# Patient Record
Sex: MALE | Race: WHITE | NOT HISPANIC OR LATINO | Employment: OTHER | URBAN - METROPOLITAN AREA
[De-identification: names, ages, dates, MRNs, and addresses within clinical notes are randomized per-mention and may not be internally consistent; named-entity substitution may affect disease eponyms.]

---

## 2018-04-27 LAB
ACETAMINOPHEN LEVEL (HISTORICAL): < 10 MCQ/M
ALBUMIN SERPL BCP-MCNC: 4.4 G/DL (ref 3.5–5.7)
ALP SERPL-CCNC: 69 IU/L (ref 40–150)
ALT SERPL W P-5'-P-CCNC: 81 IU/L (ref 0–50)
ANION GAP SERPL CALCULATED.3IONS-SCNC: 13.8 MM/L
APTT PPP: 26.5 SEC (ref 24.4–37.6)
AST SERPL W P-5'-P-CCNC: 43 U/L (ref 8–27)
BASOPHILS # BLD AUTO: 0.1 X3/UL (ref 0–0.3)
BASOPHILS # BLD AUTO: 0.7 % (ref 0–2)
BILIRUB SERPL-MCNC: 0.4 MG/DL (ref 0.3–1)
BUN SERPL-MCNC: 11 MG/DL (ref 7–25)
CALCIUM SERPL-MCNC: 9.2 MG/DL (ref 8.6–10.5)
CHLORIDE SERPL-SCNC: 102 MM/L (ref 98–107)
CK SERPL-CCNC: 106 IU/L (ref 30–223)
CO2 SERPL-SCNC: 27 MM/L (ref 21–31)
CREAT SERPL-MCNC: 0.89 MG/DL (ref 0.7–1.3)
DEPRECATED RDW RBC AUTO: 13.4 % (ref 11.5–14.5)
EGFR (HISTORICAL): > 60 GFR
EGFR AFRICAN AMERICAN (HISTORICAL): > 60 GFR
EOSINOPHIL # BLD AUTO: 0.3 X3/UL (ref 0–0.5)
EOSINOPHIL NFR BLD AUTO: 1.7 % (ref 0–5)
ETHANOL (HISTORICAL): < 10 MG/DL
GLUCOSE (HISTORICAL): 131 MG/DL (ref 65–99)
HCT VFR BLD AUTO: 40 % (ref 42–52)
HGB BLD-MCNC: 13.3 G/DL (ref 14–18)
INR PPP: 0.97 (ref 0.9–1.5)
LYMPHOCYTES # BLD AUTO: 5.7 X3/UL (ref 1.2–4.2)
LYMPHOCYTES NFR BLD AUTO: 34.6 % (ref 20.5–51.1)
MCH RBC QN AUTO: 29.6 PG (ref 26–34)
MCHC RBC AUTO-ENTMCNC: 33.4 G/DL (ref 31–36)
MCV RBC AUTO: 88.6 FL (ref 81–99)
MEDICAL ALCOHOL (HISTORICAL): 0 %
MONOCYTES # BLD AUTO: 1.6 X3/UL (ref 0–1)
MONOCYTES NFR BLD AUTO: 9.6 % (ref 1.7–12)
NEUTROPHILS # BLD AUTO: 8.8 X3/UL (ref 1.4–6.5)
NEUTS SEG NFR BLD AUTO: 53.4 % (ref 42.2–75.2)
OSMOLALITY, SERUM (HISTORICAL): 279 MOSM (ref 262–291)
PLATELET # BLD AUTO: 403 X3/UL (ref 130–400)
PMV BLD AUTO: 7.3 FL (ref 8.6–11.7)
POTASSIUM SERPL-SCNC: 3.8 MM/L (ref 3.5–5.5)
PROTHROMBIN TIME (HISTORICAL): 11.2 SEC (ref 10.1–12.9)
RBC # BLD AUTO: 4.51 X6/UL (ref 4.3–5.9)
SALICYLATE LEVEL (HISTORICAL): < 5 MG/DL (ref 10–40)
SODIUM SERPL-SCNC: 139 MM/L (ref 134–143)
TOTAL PROTEIN (HISTORICAL): 7.4 G/DL (ref 6.4–8.9)
TROPONIN I SERPL-MCNC: < 0.03 NG/ML
TSH SERPL DL<=0.05 MIU/L-ACNC: 1.2 UIU/M (ref 0.45–5.33)
WBC # BLD AUTO: 16.4 X3/UL (ref 4.8–10.8)

## 2022-10-31 ENCOUNTER — APPOINTMENT (EMERGENCY)
Dept: RADIOLOGY | Facility: HOSPITAL | Age: 42
End: 2022-10-31

## 2022-10-31 ENCOUNTER — HOSPITAL ENCOUNTER (EMERGENCY)
Facility: HOSPITAL | Age: 42
Discharge: HOME | End: 2022-10-31
Attending: EMERGENCY MEDICINE

## 2022-10-31 VITALS
SYSTOLIC BLOOD PRESSURE: 142 MMHG | DIASTOLIC BLOOD PRESSURE: 105 MMHG | OXYGEN SATURATION: 98 % | HEART RATE: 71 BPM | TEMPERATURE: 97.6 F | RESPIRATION RATE: 19 BRPM

## 2022-10-31 DIAGNOSIS — R07.9 CHEST PAIN: Primary | ICD-10-CM

## 2022-10-31 DIAGNOSIS — F11.10 NARCOTIC ABUSE (HCC): ICD-10-CM

## 2022-10-31 LAB
4HR DELTA HS TROPONIN: 3 NG/L
ALBUMIN SERPL BCP-MCNC: 4.4 G/DL (ref 3.5–5)
ALP SERPL-CCNC: 60 U/L (ref 34–104)
ALT SERPL W P-5'-P-CCNC: 25 U/L (ref 7–52)
ANION GAP SERPL CALCULATED.3IONS-SCNC: 9 MMOL/L (ref 4–13)
AST SERPL W P-5'-P-CCNC: 22 U/L (ref 13–39)
BASOPHILS # BLD AUTO: 0.08 THOUSANDS/ÂΜL (ref 0–0.1)
BASOPHILS NFR BLD AUTO: 1 % (ref 0–1)
BILIRUB SERPL-MCNC: 0.42 MG/DL (ref 0.2–1)
BUN SERPL-MCNC: 10 MG/DL (ref 5–25)
CALCIUM SERPL-MCNC: 9.4 MG/DL (ref 8.4–10.2)
CARDIAC TROPONIN I PNL SERPL HS: 4 NG/L
CARDIAC TROPONIN I PNL SERPL HS: 7 NG/L
CHLORIDE SERPL-SCNC: 102 MMOL/L (ref 96–108)
CO2 SERPL-SCNC: 27 MMOL/L (ref 21–32)
CREAT SERPL-MCNC: 0.8 MG/DL (ref 0.6–1.3)
EOSINOPHIL # BLD AUTO: 0.19 THOUSAND/ÂΜL (ref 0–0.61)
EOSINOPHIL NFR BLD AUTO: 2 % (ref 0–6)
ERYTHROCYTE [DISTWIDTH] IN BLOOD BY AUTOMATED COUNT: 12.6 % (ref 11.6–15.1)
ETHANOL EXG-MCNC: 0 MG/DL
GFR SERPL CREATININE-BSD FRML MDRD: 110 ML/MIN/1.73SQ M
GLUCOSE SERPL-MCNC: 160 MG/DL (ref 65–140)
HCT VFR BLD AUTO: 39 % (ref 36.5–49.3)
HGB BLD-MCNC: 13.1 G/DL (ref 12–17)
IMM GRANULOCYTES # BLD AUTO: 0.05 THOUSAND/UL (ref 0–0.2)
IMM GRANULOCYTES NFR BLD AUTO: 0 % (ref 0–2)
LYMPHOCYTES # BLD AUTO: 3.55 THOUSANDS/ÂΜL (ref 0.6–4.47)
LYMPHOCYTES NFR BLD AUTO: 30 % (ref 14–44)
MCH RBC QN AUTO: 30.4 PG (ref 26.8–34.3)
MCHC RBC AUTO-ENTMCNC: 33.6 G/DL (ref 31.4–37.4)
MCV RBC AUTO: 91 FL (ref 82–98)
MONOCYTES # BLD AUTO: 0.74 THOUSAND/ÂΜL (ref 0.17–1.22)
MONOCYTES NFR BLD AUTO: 6 % (ref 4–12)
NEUTROPHILS # BLD AUTO: 7.05 THOUSANDS/ÂΜL (ref 1.85–7.62)
NEUTS SEG NFR BLD AUTO: 61 % (ref 43–75)
NRBC BLD AUTO-RTO: 0 /100 WBCS
PLATELET # BLD AUTO: 309 THOUSANDS/UL (ref 149–390)
PMV BLD AUTO: 9.6 FL (ref 8.9–12.7)
POTASSIUM SERPL-SCNC: 3.6 MMOL/L (ref 3.5–5.3)
PROT SERPL-MCNC: 7.9 G/DL (ref 6.4–8.4)
RBC # BLD AUTO: 4.31 MILLION/UL (ref 3.88–5.62)
SODIUM SERPL-SCNC: 138 MMOL/L (ref 135–147)
WBC # BLD AUTO: 11.66 THOUSAND/UL (ref 4.31–10.16)

## 2022-11-01 LAB
ATRIAL RATE: 69 BPM
P AXIS: 55 DEGREES
PR INTERVAL: 164 MS
QRS AXIS: 60 DEGREES
QRSD INTERVAL: 86 MS
QT INTERVAL: 404 MS
QTC INTERVAL: 432 MS
T WAVE AXIS: 18 DEGREES
VENTRICULAR RATE: 69 BPM

## 2022-11-01 NOTE — ED NOTES
This writer discussed the patients current presentation and recommended discharge plan with Dr Leda Beckett  They agree with the patient being discharged at this time with referrals and/or information about outpatient  The patient was Instructed to follow up with their PCP  The patient was provided with referral information for outpatient drug rehab  This writer and the patient completed a safety plan  The patient was provided with a copy of their safety plan with encouragement to utilize the plan following discharge  In addition, the patient was instructed to call local [facility] crisis, other crisis services, [facility] or to go to the nearest ER immediately if their situation changes at any time  This writer discussed discharge plans with the patient and family who agrees with and understands the discharge plans           SAFETY PLAN  Warning Signs (thoughts, images, mood, behavior, situations) of a potential crisis: life stressors      Coping Skills (what can I do to take my mind off the problem, or to keep myself safe): no coping skills besides substance use      Outside Support (who can I reach out to for support and help): wife and daughter        National Suicide Prevention Hotline:  [address]: [facility]: [place] [phone] - [address] [phone] - Crisis   [phone] - Peer Support Talk Line (1-9pm daily)  [phone] - Teen Support Talk Line (1-9pm daily)  [address] 1111 [facility]) [phone] - 1758 [facility]

## 2022-11-01 NOTE — ED PROVIDER NOTES
History  Chief Complaint   Patient presents with   • Chest Pain     Pt reports hx of Fentanyl use, last dose 2 hours ago  Reports intermittent CP over the last hour  Reports chest has not felt right over the past month   (+)SOB/N        used: No      69-year-old male presents emergency department for evaluation of left-sided chest pain intermittent over the past several months  Patient states he feels it is due to his fentanyl usage  When he stopped using this type of fentanyl when he moved to the [facility] his pain had improved  Denies any aggravating or alleviating symptoms  Also feels some component of anxiety causing his pain to worsened today  Denies any new abdominal pain  Reports nausea, no hematemesis  Reports history of ulcerative colitis  None       History reviewed  No pertinent past medical history  History reviewed  No pertinent surgical history  History reviewed  No pertinent family history  I have reviewed and agree with the history as documented  E-Cigarette/Vaping     E-Cigarette/Vaping Substances   • Nicotine Yes    • THC Yes    • CBD No      Social History     Tobacco Use   • Smoking status: Current Every Day Smoker     Packs/day: 1 50   • Smokeless tobacco: Never Used   Substance Use Topics   • Drug use: Yes     Comment: [place] with dialazine       Review of Systems   All other systems reviewed and are negative  Physical Exam  Physical Exam  Vitals and nursing note reviewed  Constitutional:       General: He is not in acute distress  Appearance: He is well-developed  He is not diaphoretic  HENT:      Head: Normocephalic and atraumatic  Right Ear: External ear normal       Left Ear: External ear normal    Eyes:      General:         Right eye: No discharge  Left eye: No discharge  Pupils: Pupils are equal, round, and reactive to light  Neck:      Thyroid: No thyromegaly  Trachea: No tracheal deviation  Cardiovascular:      Rate and Rhythm: Normal rate and regular rhythm  Heart sounds: No murmur heard  Pulmonary:      Effort: Pulmonary effort is normal       Breath sounds: Normal breath sounds  Abdominal:      General: Bowel sounds are normal  There is no distension  Palpations: Abdomen is soft  Tenderness: There is no abdominal tenderness  Musculoskeletal:         General: No deformity  Normal range of motion  Cervical back: Normal range of motion and neck supple  Skin:     General: Skin is warm  Capillary Refill: Capillary refill takes less than 2 seconds  Neurological:      Mental Status: He is alert and oriented to person, place, and time  Cranial Nerves: No cranial nerve deficit  Motor: No abnormal muscle tone     Psychiatric:         Behavior: Behavior normal          Vital Signs  ED Triage Vitals [10/31/22 1946]   Temperature Pulse Respirations Blood Pressure SpO2   97 6 °F (36 4 °C) 71 19 (!) 142/105 98 %      Temp Source Heart Rate Source Patient Position - Orthostatic VS BP Location FiO2 (%)   Oral Monitor -- -- --      Pain Score       --           Vitals:    10/31/22 1946   BP: (!) 142/105   Pulse: 71         Visual Acuity      ED Medications  Medications - No data to display    Diagnostic Studies  Results Reviewed     Procedure Component Value Units Date/Time    HS Troponin I 4hr [[id number]]  (Normal) Collected: 10/31/22 2230    Lab Status: Final result Specimen: Blood from Arm, Right Updated: 10/31/22 2302     hs TnI 4hr 7 ng/L      Delta 4hr hsTnI 3 ng/L     POCT alcohol breath test [[id number]]  (Normal) Resulted: 10/31/22 2119    Lab Status: Final result Updated: 10/31/22 2119     EXTBreath Alcohol 0 00    HS Troponin 0hr (reflex protocol) [[id number]]  (Normal) Collected: 10/31/22 1954    Lab Status: Final result Specimen: Blood from Arm, Left Updated: 10/31/22 2043     hs TnI 0hr 4 ng/L     Comprehensive metabolic panel [[id number]]  (Abnormal) Collected: 10/31/22 1954    Lab Status: Final result Specimen: Blood from Arm, Left Updated: 10/31/22 2039     Sodium 138 mmol/L      Potassium 3 6 mmol/L      Chloride 102 mmol/L      CO2 27 mmol/L      ANION GAP 9 mmol/L      BUN 10 mg/dL      Creatinine 0 80 mg/dL      Glucose 160 mg/dL      Calcium 9 4 mg/dL      AST 22 U/L      ALT 25 U/L      Alkaline Phosphatase 60 U/L      Total Protein 7 9 g/dL      Albumin 4 4 g/dL      Total Bilirubin 0 42 mg/dL      eGFR 110 ml/min/1 73sq m     Narrative:      [facility] guidelines for Chronic Kidney Disease (CKD):   •  Stage 1 with normal or high GFR (GFR > 90 mL/min/1 73 square meters)  •  Stage 2 Mild CKD (GFR = 60-89 mL/min/1 73 square meters)  •  Stage 3A Moderate CKD (GFR = 45-59 mL/min/1 73 square meters)  •  Stage 3B Moderate CKD (GFR = 30-44 mL/min/1 73 square meters)  •  Stage 4 Severe CKD (GFR = 15-29 mL/min/1 73 square meters)  •  Stage 5 End Stage CKD (GFR <15 mL/min/1 73 square meters)  Note: GFR calculation is accurate only with a steady state creatinine    CBC and differential [[id number]]  (Abnormal) Collected: 10/31/22 1954    Lab Status: Final result Specimen: Blood from Arm, Left Updated: 10/31/22 2013     WBC 11 66 Thousand/uL      RBC 4 31 Million/uL      Hemoglobin 13 1 g/dL      Hematocrit 39 0 %      MCV 91 fL      MCH 30 4 pg      MCHC 33 6 g/dL      RDW 12 6 %      MPV 9 6 fL      Platelets 014 Thousands/uL      nRBC 0 /100 WBCs      Neutrophils Relative 61 %      Immat GRANS % 0 %      Lymphocytes Relative 30 %      Monocytes Relative 6 %      Eosinophils Relative 2 %      Basophils Relative 1 %      Neutrophils Absolute 7 05 Thousands/µL      Immature Grans Absolute 0 05 Thousand/uL      Lymphocytes Absolute 3 55 Thousands/µL      Monocytes Absolute 0 74 Thousand/µL      Eosinophils Absolute 0 19 Thousand/µL      Basophils Absolute 0 08 Thousands/µL                  XR chest 1 view portable   ED Interpretation by Sara Ramesh Lenny Lee MD (10/31 2327)   No acute cardiopulmonary disease identified  Procedures  ECG 12 Lead Documentation Only    Date/Time: 10/31/2022 7:48 PM  Performed by: Sami Dexter MD  Authorized by: Sami Dexter MD     Indications / Diagnosis:  CP  ECG reviewed by me, the ED Provider: yes    Patient location:  ED  Interpretation:     Interpretation: normal    Rate:     ECG rate:  69    ECG rate assessment: normal    Rhythm:     Rhythm: sinus rhythm    Ectopy:     Ectopy: none    QRS:     QRS axis:  Normal    QRS intervals:  Normal  Conduction:     Conduction: normal    ST segments:     ST segments:  Normal             ED Course             HEART Risk Score    Flowsheet Row Most Recent Value   Heart Score Risk Calculator    History 0 Filed at: 10/31/2022 2220   ECG 0 Filed at: 10/31/2022 2220   Age 0 Filed at: 10/31/2022 2220   Risk Factors 1 Filed at: 10/31/2022 2220   Troponin 0 Filed at: 10/31/2022 2220   HEART Score 1 Filed at: 10/31/2022 2220                                      MDM  Number of Diagnoses or Management Options  Chest pain: new and requires workup  Narcotic abuse [facility]): new and requires workup  Diagnosis management comments: Chest pain was a nonischemic EKG, initial troponin for, chest pain less than 1 hour, will perform 2nd troponin  Second troponin with no significant elevation  Patient reassessed, currently chest pain-free  X-ray reviewed, no acute cardiopulmonary abnormality  Suspect musculoskeletal in nature  No fever to suggest endocarditis  No pleuritic nature, perc negative, doubt PE  Patient able to speak with [facility] representative, set up for outpatient rehab bed tomorrow  He was discharged with significant other  He is goal oriented, no suicidal or homicidal ideation, chest pain-free and he is excited about attending rehab tomorrow          Amount and/or Complexity of Data Reviewed  Clinical lab tests: ordered and reviewed  Tests in the radiology section of CPT®: ordered and reviewed  Tests in the medicine section of CPT®: ordered and reviewed  Discuss the patient with other providers: yes    Risk of Complications, Morbidity, and/or Mortality  Presenting problems: high  Diagnostic procedures: moderate  Management options: high    Patient Progress  Patient progress: improved      Disposition  Final diagnoses:   Chest pain   Narcotic abuse ([address] )     Time reflects when diagnosis was documented in both MDM as applicable and the Disposition within this note     Time User Action Codes Description Comment    10/31/2022 10:20 PM Zbigniew Alexiser Add [R07 9] Chest pain     10/31/2022 10:20 PM Héctorda Jorjeisker Add [F11 10] Narcotic abuse [facility])       ED Disposition     ED Disposition   Discharge    Condition   Stable    Date/Time   Mon Oct 31, 2022 11:31 PM    Comment   Geoffrey Dhillon discharge to home/self care  Follow-up Information     Follow up With Specialties Details Why Contact Info Additional Information    Rehab  Go to  Tomorrow as arranged tonight      [address] Emergency Department Emergency Medicine Go to  As needed, If symptoms worsen [address] Emergency Department, [address], [place], [address]          There are no discharge medications for this patient  No discharge procedures on file      PDMP Review     None          ED Provider  Electronically Signed by           Scott Lan MD  11/01/22 5337

## 2022-11-01 NOTE — DISCHARGE INSTRUCTIONS
This writer discussed the patients current presentation and recommended discharge plan with Dr Bam Chilsd  They agree with the patient being discharged at this time with referrals and/or information about outpatient  The patient was Instructed to follow up with their PCP  The patient was provided with referral information for outpatient drug rehab  This writer and the patient completed a safety plan  The patient was provided with a copy of their safety plan with encouragement to utilize the plan following discharge  In addition, the patient was instructed to call local [facility] crisis, other crisis services, [facility] or to go to the nearest ER immediately if their situation changes at any time  This writer discussed discharge plans with the patient and family who agrees with and understands the discharge plans           SAFETY PLAN  Warning Signs (thoughts, images, mood, behavior, situations) of a potential crisis: life stressors      Coping Skills (what can I do to take my mind off the problem, or to keep myself safe): no coping skills besides substance use      Outside Support (who can I reach out to for support and help): wife and daughter        National Suicide Prevention Hotline:  [address]: [facility]: [place] [phone] - 632 [facility] [address] [place] [address] [address] [phone] - Crisis   [phone] - Peer Support Talk Line (1-9pm daily)  [phone] - Teen Support Talk Line (1-9pm daily)  [address] 1111 [facility]) [phone] - 2696 [facility]

## 2022-11-01 NOTE — ED NOTES
CW spoke with pt and provided drug rehab outpatient resources  Pt is in the ED with concerns about chest pain  He reports using substances for the past 20 years, and in the last 2 years he started fentanyl use  CW attempted to contact [facility] to make referral for drug rehab services  There was no response  CW left a voice mail

## 2023-07-05 ENCOUNTER — APPOINTMENT (EMERGENCY)
Dept: RADIOLOGY | Facility: HOSPITAL | Age: 43
End: 2023-07-05

## 2023-07-05 ENCOUNTER — HOSPITAL ENCOUNTER (EMERGENCY)
Facility: HOSPITAL | Age: 43
Discharge: HOME | End: 2023-07-05
Attending: EMERGENCY MEDICINE

## 2023-07-05 VITALS
OXYGEN SATURATION: 97 % | SYSTOLIC BLOOD PRESSURE: 139 MMHG | RESPIRATION RATE: 16 BRPM | DIASTOLIC BLOOD PRESSURE: 85 MMHG | WEIGHT: 192.68 LBS | TEMPERATURE: 98.4 F | HEART RATE: 70 BPM

## 2023-07-05 DIAGNOSIS — R00.2 PALPITATIONS: ICD-10-CM

## 2023-07-05 DIAGNOSIS — R07.9 CHEST PAIN, UNSPECIFIED TYPE: Primary | ICD-10-CM

## 2023-07-05 DIAGNOSIS — F11.21 OPIOID USE DISORDER, SEVERE, IN EARLY REMISSION (HCC): ICD-10-CM

## 2023-07-05 LAB
ALBUMIN SERPL BCP-MCNC: 4.7 G/DL (ref 3.5–5)
ALP SERPL-CCNC: 62 U/L (ref 34–104)
ALT SERPL W P-5'-P-CCNC: 25 U/L (ref 7–52)
ANION GAP SERPL CALCULATED.3IONS-SCNC: 9 MMOL/L
AST SERPL W P-5'-P-CCNC: 27 U/L (ref 13–39)
BASOPHILS # BLD AUTO: 0.06 THOUSANDS/ÂΜL (ref 0–0.1)
BASOPHILS NFR BLD AUTO: 1 % (ref 0–1)
BILIRUB SERPL-MCNC: 0.36 MG/DL (ref 0.2–1)
BUN SERPL-MCNC: 10 MG/DL (ref 5–25)
CALCIUM SERPL-MCNC: 9.8 MG/DL (ref 8.4–10.2)
CARDIAC TROPONIN I PNL SERPL HS: 2 NG/L
CHLORIDE SERPL-SCNC: 103 MMOL/L (ref 96–108)
CO2 SERPL-SCNC: 24 MMOL/L (ref 21–32)
CREAT SERPL-MCNC: 0.64 MG/DL (ref 0.6–1.3)
EOSINOPHIL # BLD AUTO: 0.07 THOUSAND/ÂΜL (ref 0–0.61)
EOSINOPHIL NFR BLD AUTO: 1 % (ref 0–6)
ERYTHROCYTE [DISTWIDTH] IN BLOOD BY AUTOMATED COUNT: 13 % (ref 11.6–15.1)
GFR SERPL CREATININE-BSD FRML MDRD: 119 ML/MIN/1.73SQ M
GLUCOSE SERPL-MCNC: 108 MG/DL (ref 65–140)
HCT VFR BLD AUTO: 43.7 % (ref 36.5–49.3)
HGB BLD-MCNC: 14.7 G/DL (ref 12–17)
IMM GRANULOCYTES # BLD AUTO: 0.03 THOUSAND/UL (ref 0–0.2)
IMM GRANULOCYTES NFR BLD AUTO: 0 % (ref 0–2)
LYMPHOCYTES # BLD AUTO: 1.7 THOUSANDS/ÂΜL (ref 0.6–4.47)
LYMPHOCYTES NFR BLD AUTO: 16 % (ref 14–44)
MAGNESIUM SERPL-MCNC: 2.2 MG/DL (ref 1.9–2.7)
MCH RBC QN AUTO: 29.9 PG (ref 26.8–34.3)
MCHC RBC AUTO-ENTMCNC: 33.6 G/DL (ref 31.4–37.4)
MCV RBC AUTO: 89 FL (ref 82–98)
MONOCYTES # BLD AUTO: 0.67 THOUSAND/ÂΜL (ref 0.17–1.22)
MONOCYTES NFR BLD AUTO: 6 % (ref 4–12)
NEUTROPHILS # BLD AUTO: 7.87 THOUSANDS/ÂΜL (ref 1.85–7.62)
NEUTS SEG NFR BLD AUTO: 76 % (ref 43–75)
NRBC BLD AUTO-RTO: 0 /100 WBCS
PLATELET # BLD AUTO: 344 THOUSANDS/UL (ref 149–390)
PMV BLD AUTO: 9.9 FL (ref 8.9–12.7)
POTASSIUM SERPL-SCNC: 4 MMOL/L (ref 3.5–5.3)
PROT SERPL-MCNC: 8.3 G/DL (ref 6.4–8.4)
RBC # BLD AUTO: 4.91 MILLION/UL (ref 3.88–5.62)
SODIUM SERPL-SCNC: 136 MMOL/L (ref 135–147)
WBC # BLD AUTO: 10.4 THOUSAND/UL (ref 4.31–10.16)

## 2023-07-05 PROCEDURE — 93005 ELECTROCARDIOGRAM TRACING: CPT

## 2023-07-05 PROCEDURE — 83735 ASSAY OF MAGNESIUM: CPT | Performed by: EMERGENCY MEDICINE

## 2023-07-05 PROCEDURE — 85025 COMPLETE CBC W/AUTO DIFF WBC: CPT | Performed by: EMERGENCY MEDICINE

## 2023-07-05 PROCEDURE — 80053 COMPREHEN METABOLIC PANEL: CPT | Performed by: EMERGENCY MEDICINE

## 2023-07-05 PROCEDURE — 71046 X-RAY EXAM CHEST 2 VIEWS: CPT

## 2023-07-05 PROCEDURE — 84484 ASSAY OF TROPONIN QUANT: CPT | Performed by: EMERGENCY MEDICINE

## 2023-07-05 PROCEDURE — 36415 COLL VENOUS BLD VENIPUNCTURE: CPT | Performed by: EMERGENCY MEDICINE

## 2023-07-05 NOTE — ED PROVIDER NOTES
History  Chief Complaint   Patient presents with   • Chest Pain     "Weird feeling in left chest area." Free of methadone x 3 weeks. 54-year-old male with a history of opioid use disorder presenting for evaluation of chest discomfort. Patient states his symptoms started several days ago. Notes an intermittent "weird feeling" on the left side of his chest without radiation. States sometimes it feels like an irregular beat or palpitation. States he had a previous history of similar symptoms that have resolved. Denies associated diaphoresis, nausea, vomiting, shortness of breath. Denies fever, chills, upper respiratory symptoms, abdominal pain, leg swelling, hemoptysis, recent travel, surgery, or immobility. Denies personal or family history of DVT or PE. Patient recently started on methadone 3 weeks ago and has been titrated up to 80 mg. This dose has been stable for 5 days. Patient has a history of injection opioid use last use 3 weeks ago. Did not take anything for his symptoms at home. Prior to Admission Medications   Prescriptions Last Dose Informant Patient Reported? Taking? METHADONE HCL PO   Yes Yes   Sig: Take 80 mg by mouth in the morning      Facility-Administered Medications: None       History reviewed. No pertinent past medical history. History reviewed. No pertinent surgical history. History reviewed. No pertinent family history. I have reviewed and agree with the history as documented. E-Cigarette/Vaping   • E-Cigarette Use Current Every Day User      E-Cigarette/Vaping Substances   • Nicotine Yes      Social History     Tobacco Use   • Smoking status: Every Day     Packs/day: 1.50     Types: Cigarettes   • Smokeless tobacco: Never   Vaping Use   • Vaping Use: Every day   • Substances: Nicotine   Substance Use Topics   • Drug use: Not Currently     Comment: stanton with dialazine       Review of Systems   Constitutional: Negative for chills and fever.    HENT: Negative for congestion, rhinorrhea and sore throat. Eyes: Negative for pain, discharge and visual disturbance. Respiratory: Negative for cough and shortness of breath. Cardiovascular: Positive for chest pain and palpitations. Negative for leg swelling. Gastrointestinal: Negative for abdominal pain, diarrhea, nausea and vomiting. Genitourinary: Negative for dysuria, frequency and hematuria. Musculoskeletal: Negative for arthralgias and myalgias. Skin: Negative for color change and rash. Neurological: Negative for dizziness, weakness, light-headedness, numbness and headaches. Hematological: Does not bruise/bleed easily. Physical Exam  Physical Exam  Vitals and nursing note reviewed. Constitutional:       General: He is not in acute distress. Appearance: He is well-developed. He is not ill-appearing, toxic-appearing or diaphoretic. HENT:      Head: Normocephalic and atraumatic. Eyes:      Pupils: Pupils are equal, round, and reactive to light. Cardiovascular:      Rate and Rhythm: Normal rate and regular rhythm. Heart sounds: No murmur heard. Pulmonary:      Effort: Pulmonary effort is normal. No tachypnea, accessory muscle usage or respiratory distress. Breath sounds: Normal breath sounds. No stridor. No decreased breath sounds, wheezing, rhonchi or rales. Chest:      Chest wall: No tenderness. Abdominal:      Palpations: Abdomen is soft. Tenderness: There is no abdominal tenderness. There is no guarding or rebound. Musculoskeletal:         General: Normal range of motion. Cervical back: Neck supple. Right lower leg: No tenderness. No edema. Left lower leg: No tenderness. No edema. Skin:     General: Skin is warm and dry. Findings: No rash. Neurological:      General: No focal deficit present. Mental Status: He is alert and oriented to person, place, and time.    Psychiatric:         Mood and Affect: Mood normal.         Behavior: Behavior normal.         Vital Signs  ED Triage Vitals   Temperature Pulse Respirations Blood Pressure SpO2   07/05/23 1430 07/05/23 1430 07/05/23 1430 07/05/23 1436 07/05/23 1430   98.4 °F (36.9 °C) 102 16 (!) 175/117 98 %      Temp Source Heart Rate Source Patient Position - Orthostatic VS BP Location FiO2 (%)   07/05/23 1430 07/05/23 1430 -- -- --   Oral Monitor         Pain Score       07/05/23 1426       4           Vitals:    07/05/23 1430 07/05/23 1436 07/05/23 1530 07/05/23 1600   BP:  (!) 175/117 127/82 139/85   Pulse: 102  72 70         Visual Acuity      ED Medications  Medications - No data to display    Diagnostic Studies  Results Reviewed     Procedure Component Value Units Date/Time    HS Troponin 0hr (reflex protocol) [[id number]]  (Normal) Collected: 07/05/23 1522    Lab Status: Final result Specimen: Blood from Arm, Right Updated: 07/05/23 1618     hs TnI 0hr 2 ng/L     HS Troponin I 4hr [[id number]]     Lab Status: No result Specimen: Blood     HS Troponin I 2hr [[id number]]     Lab Status: No result Specimen: Blood     Comprehensive metabolic panel [[id number]] Collected: 07/05/23 1522    Lab Status: Final result Specimen: Blood from Arm, Right Updated: 07/05/23 1613     Sodium 136 mmol/L      Potassium 4.0 mmol/L      Chloride 103 mmol/L      CO2 24 mmol/L      ANION GAP 9 mmol/L      BUN 10 mg/dL      Creatinine 0.64 mg/dL      Glucose 108 mg/dL      Calcium 9.8 mg/dL      AST 27 U/L      ALT 25 U/L      Alkaline Phosphatase 62 U/L      Total Protein 8.3 g/dL      Albumin 4.7 g/dL      Total Bilirubin 0.36 mg/dL      eGFR 119 ml/min/1.73sq m     Narrative:      [place] guidelines for Chronic Kidney Disease (CKD):   •  Stage 1 with normal or high GFR (GFR > 90 mL/min/1.73 square meters)  •  Stage 2 Mild CKD (GFR = 60-89 mL/min/1.73 square meters)  •  Stage 3A Moderate CKD (GFR = 45-59 mL/min/1.73 square meters)  •  Stage 3B Moderate CKD (GFR = 30-44 mL/min/1.73 square meters)  •  Stage 4 Severe CKD (GFR = 15-29 mL/min/1.73 square meters)  •  Stage 5 End Stage CKD (GFR <15 mL/min/1.73 square meters)  Note: GFR calculation is accurate only with a steady state creatinine    Magnesium [[id number]]  (Normal) Collected: 07/05/23 1522    Lab Status: Final result Specimen: Blood from Arm, Right Updated: 07/05/23 1613     Magnesium 2.2 mg/dL     CBC and differential [[id number]]  (Abnormal) Collected: 07/05/23 1522    Lab Status: Final result Specimen: Blood from Arm, Right Updated: 07/05/23 1559     WBC 10.40 Thousand/uL      RBC 4.91 Million/uL      Hemoglobin 14.7 g/dL      Hematocrit 43.7 %      MCV 89 fL      MCH 29.9 pg      MCHC 33.6 g/dL      RDW 13.0 %      MPV 9.9 fL      Platelets 895 Thousands/uL      nRBC 0 /100 WBCs      Neutrophils Relative 76 %      Immat GRANS % 0 %      Lymphocytes Relative 16 %      Monocytes Relative 6 %      Eosinophils Relative 1 %      Basophils Relative 1 %      Neutrophils Absolute 7.87 Thousands/µL      Immature Grans Absolute 0.03 Thousand/uL      Lymphocytes Absolute 1.70 Thousands/µL      Monocytes Absolute 0.67 Thousand/µL      Eosinophils Absolute 0.07 Thousand/µL      Basophils Absolute 0.06 Thousands/µL                  XR chest 2 views   ED Interpretation by Iliana Moses MD (07/05 1771)   No acute disease      Final Result by Rita Christopher MD (07/05 4774)      No acute cardiopulmonary disease.                Workstation performed: [id number]                    Procedures  ECG 12 Lead Documentation Only    Date/Time: 7/5/2023 2:45 PM    Performed by: Iliana Moses MD  Authorized by: Iliana Moses MD    Indications / Diagnosis:  Chest pain/palpitations, eval for dysrhythmia and ischemia  ECG reviewed by me, the ED Provider: yes    Patient location:  ED  Previous ECG:     Previous ECG:  Compared to current    Comparison ECG info:  10/31/2022    Similarity:  Changes noted  Interpretation: Interpretation: non-specific    Rate:     ECG rate:  100    ECG rate assessment: normal    Rhythm:     Rhythm: sinus rhythm    Ectopy:     Ectopy: none    QRS:     QRS axis:  Normal    QRS intervals:  Normal  Conduction:     Conduction: normal    ST segments:     ST segments:  Normal  T waves:     T waves: normal    Comments:      Decreased T wave amplitude laterally, TWI in lead V1 resolved. No other acute changes             ED Course  ED Course as of 07/05/23 1745   Wed Jul 05, 2023   1610 WBC(!): 10.40   1610 Neutrophils %(!): 76   1610 XR chest 2 views  IMPRESSION:     No acute cardiopulmonary disease   1628 Magnesium: 2.2   1628 Comprehensive metabolic panel  Grossly normal   1628 hs TnI 0hr: 2             HEART Risk Score    Flowsheet Row Most Recent Value   Heart Score Risk Calculator    History 0 Filed at: 07/05/2023 1634   ECG 0 Filed at: 07/05/2023 1634   Age 0 Filed at: 07/05/2023 1634   Risk Factors 1 Filed at: 07/05/2023 1634   Troponin 0 Filed at: 07/05/2023 1634   HEART Score 1 Filed at: 07/05/2023 1634                             Wells' Criteria for PE    Flowsheet Row Most Recent Value   Wells' Criteria for PE    Clinical signs and symptoms of DVT 0 Filed at: 07/05/2023 1635   PE is primary diagnosis or equally likely 0 Filed at: 07/05/2023 1635   HR >100 1.5 Filed at: 07/05/2023 1635   Immobilization at least 3 days or Surgery in the previous 4 weeks 0 Filed at: 07/05/2023 1635   Previous, objectively diagnosed PE or DVT 0 Filed at: 07/05/2023 1635   Hemoptysis 0 Filed at: 07/05/2023 1635   Malignancy with treatment within 6 months or palliative 0 Filed at: 07/05/2023 1635   Wells' Criteria Total 1.5 Filed at: 07/05/2023 1635                Medical Decision Making  65-year-old male with a history of opioid use disorder on methadone presenting for evaluation of chest pain and palpitations.   Differential diagnosis includes acute coronary syndrome, pneumonia, pneumothorax, pulm edema, pulmonary embolism, dissection, anemia, malignant dysrhythmia. EKG shows normal sinus rhythm without evidence of ischemia or malignant dysrhythmia. Initial troponin is 2. Symptoms have been ongoing for several days. Low suspicion for ACS at this time. Do not feel he requires further cardiac work-up today. Heart score is 1; patient is at low risk for major acute cardiac event the next 30 days. No evidence of QTc prolongation. No evidence of electrolyte derangement on laboratory studies. Patient has mild leukocytosis, which is nonspecific. Chest x-ray per my interpretation is negative for acute cardiopulmonary abnormality. Low suspicion for dissection and pulmonary embolism at this time. Patient is appropriate for discharge home with outpatient follow-up. Referred to internal medicine to establish care and for further evaluation. Discussed risks of QTc prolongation with increasing dose of methadone with patient and significant other at bedside. Return precautions thoroughly discussed. Patient is in agreement and understanding of these instructions. Amount and/or Complexity of Data Reviewed  Labs: ordered. Decision-making details documented in ED Course. Radiology: ordered and independent interpretation performed. Decision-making details documented in ED Course.           Disposition  Final diagnoses:   Chest pain, unspecified type   Palpitations   Opioid use disorder, severe, in early remission [facility])     Time reflects when diagnosis was documented in both MDM as applicable and the Disposition within this note     Time User Action Codes Description Comment    7/5/2023  4:34 PM Gracie Whitney Add [R07.9] Chest pain, unspecified type     7/5/2023  4:34 PM Gracie Whitney Add [R00.2] Palpitations     7/5/2023  4:36 PM Gracie Whitney Add [F11.21] Opioid use disorder, severe, in early remission [facility])       ED Disposition     ED Disposition   Discharge    Condition   Stable    Date/Time   Wed Jul 5, 2023 4:34 PM    Comment   Maryanne Moreno discharge to home/self care.                Follow-up Information     Follow up With Specialties Details Why Contact Info Additional Information    [facility] Internal Medicine [place] Internal Medicine Schedule an appointment as soon as possible for a visit   [address] [facility] [address]  1 [facility] Internal Medicine [place] ([place] [address], [place], [address] [phone]          Discharge Medication List as of 7/5/2023  4:36 PM      CONTINUE these medications which have NOT CHANGED    Details   METHADONE HCL PO Take 80 mg by mouth in the morning, Historical Med                 PDMP Review     None          ED Provider  Electronically Signed by           Antony Gurrola MD  07/05/23 5749

## 2023-07-06 LAB
ATRIAL RATE: 100 BPM
P AXIS: 68 DEGREES
PR INTERVAL: 148 MS
QRS AXIS: 71 DEGREES
QRSD INTERVAL: 90 MS
QT INTERVAL: 340 MS
QTC INTERVAL: 438 MS
T WAVE AXIS: 41 DEGREES
VENTRICULAR RATE: 100 BPM

## 2023-07-06 PROCEDURE — 93010 ELECTROCARDIOGRAM REPORT: CPT | Performed by: INTERNAL MEDICINE

## 2024-01-02 ENCOUNTER — HOSPITAL ENCOUNTER (EMERGENCY)
Facility: HOSPITAL | Age: 44
Discharge: HOME | End: 2024-01-02
Attending: EMERGENCY MEDICINE | Admitting: EMERGENCY MEDICINE
Payer: COMMERCIAL

## 2024-01-02 VITALS
HEART RATE: 81 BPM | TEMPERATURE: 98.2 F | DIASTOLIC BLOOD PRESSURE: 103 MMHG | OXYGEN SATURATION: 99 % | RESPIRATION RATE: 18 BRPM | SYSTOLIC BLOOD PRESSURE: 177 MMHG

## 2024-01-02 DIAGNOSIS — M54.32 SCIATICA OF LEFT SIDE: ICD-10-CM

## 2024-01-02 DIAGNOSIS — M79.605 LEFT LEG PAIN: Primary | ICD-10-CM

## 2024-01-02 PROCEDURE — 99282 EMERGENCY DEPT VISIT SF MDM: CPT

## 2024-01-02 PROCEDURE — 99284 EMERGENCY DEPT VISIT MOD MDM: CPT | Performed by: EMERGENCY MEDICINE

## 2024-01-02 RX ORDER — METHOCARBAMOL 500 MG/1
500 TABLET, FILM COATED ORAL 2 TIMES DAILY
Qty: 20 TABLET | Refills: 0 | Status: SHIPPED | OUTPATIENT
Start: 2024-01-02

## 2024-01-02 NOTE — ED PROVIDER NOTES
"History  Chief Complaint   Patient presents with    Leg Pain     Pt reports he was at work Friday, unsure what happened but felt pain in L buttocks, reports he thought he pulled muscle. Presents today with pain throughout L leg, reports location \"only on the outside of my leg\". Pt reports difficulty walking. Denies hx blood clots.      43-year-old male presenting to the ED with a complaint of left-sided leg pain.  Patient states that he is a [employer] worker and works with [employer] all day.  2 days ago at work he was moving around when he had a sudden onset sharp pain down the left leg.  Patient states that the pain is only on the outer aspect of the leg and travels from the buttock down to the left ankle.  Patient is able to bear weight and is able to wiggle toes.  Patient states that only part of the leg is painful and the inner part of the leg is not.  Patient states the pain is stabbing in nature and can get up to 10 out of 10.  Patient does state that certain twisting motions make the pain worse.  Patient is able to get in the positions of comfort where the pain goes away.  Patient denies direct trauma to the area.  Patient denies fever, chills, nausea, vomiting, dizziness, lightheadedness, deformity to the leg, swelling to the leg, urinary retention, urinary incontinence, constipation, saddle paresthesias.        Prior to Admission Medications   Prescriptions Last Dose Informant Patient Reported? Taking?   METHADONE HCL PO   Yes No   Sig: Take 80 mg by mouth in the morning      Facility-Administered Medications: None       No past medical history on file.    No past surgical history on file.    No family history on file.  I have reviewed and agree with the history as documented.    E-Cigarette/Vaping    E-Cigarette Use Current Every Day User      E-Cigarette/Vaping Substances    Nicotine Yes      Social History     Tobacco Use    Smoking status: Every Day     Current packs/day: 1.50     Types: Cigarettes    " Smokeless tobacco: Never   Vaping Use    Vaping status: Every Day    Substances: Nicotine   Substance Use Topics    Drug use: Not Currently     Comment: stanton with dialazine        Review of Systems   Constitutional: Negative.    HENT: Negative.     Eyes: Negative.    Respiratory: Negative.     Cardiovascular: Negative.    Gastrointestinal: Negative.    Genitourinary: Negative.    Musculoskeletal:  Positive for myalgias.   Skin: Negative.    Neurological: Negative.    Psychiatric/Behavioral: Negative.         Physical Exam  ED Triage Vitals [01/02/24 1530]   Temperature Pulse Respirations Blood Pressure SpO2   98.2 °F (36.8 °C) 81 18 (!) 177/103 99 %      Temp Source Heart Rate Source Patient Position - Orthostatic VS BP Location FiO2 (%)   Oral Monitor Sitting Right arm --      Pain Score       --             Orthostatic Vital Signs  Vitals:    01/02/24 1530   BP: (!) 177/103   Pulse: 81   Patient Position - Orthostatic VS: Sitting       Physical Exam  Constitutional:       Appearance: Normal appearance.   HENT:      Head: Normocephalic and atraumatic.      Right Ear: External ear normal.      Left Ear: External ear normal.      Nose: Nose normal.      Mouth/Throat:      Pharynx: Oropharynx is clear.   Eyes:      Extraocular Movements: Extraocular movements intact.   Cardiovascular:      Pulses: Normal pulses.      Heart sounds: Normal heart sounds.   Pulmonary:      Effort: Pulmonary effort is normal.      Breath sounds: Normal breath sounds.   Abdominal:      Palpations: Abdomen is soft.   Musculoskeletal:         General: Normal range of motion.      Cervical back: Normal range of motion.        Legs:       Comments: Left lateral sharp in nature leg pain at a max of 10 out of 10 with a minimum of 0 out of 10 based on positional movement.  No obvious deformity or trauma to the area.   Skin:     General: Skin is warm.      Capillary Refill: Capillary refill takes less than 2 seconds.   Neurological:       General: No focal deficit present.      Mental Status: He is alert and oriented to person, place, and time.   Psychiatric:         Mood and Affect: Mood normal.         Behavior: Behavior normal.         ED Medications  Medications   Diclofenac Sodium (VOLTAREN) 1 % topical gel 4 g (has no administration in time range)       Diagnostic Studies  Results Reviewed       None                   No orders to display         Procedures  Procedures      ED Course                                       Medical Decision Making  43-year-old male presenting to the ED with left-sided lateral leg pain.  Patient describing pain as stabbing in nature and running down the lateral aspect of the left leg from the buttocks.  Patient describing what sounds like sciatic pain.  At this time based on physical exam and lack of deformity or obvious signs of trauma, patient likely suffering from nerve pain likely sciatic pain.  Patient is able to bear weight on the leg.  No imaging deemed necessary at this time.  Patient's pain is not specific to any bony tenderness.  Patient has full range of motion and 2+ pulses in the dorsal tibialis and pedal pulse.  Patient is given a prescription for Voltaren gel as well as Robaxin.  Patient told to follow-up with his primary care provider soon as possible to discuss today's ED visit.  Patient given a work note for rest for the rest of the week.  Patient comfortable with this plan.  Patient discharged.    Risk  Prescription drug management.          Disposition  Final diagnoses:   Left leg pain   Sciatica of left side     Time reflects when diagnosis was documented in both MDM as applicable and the Disposition within this note       Time User Action Codes Description Comment    1/2/2024  4:35 PM Angelo Leblanc [M79.605] Left leg pain     1/2/2024  4:35 PM Angelo Leblanc Add [M54.32] Sciatica of left side           ED Disposition       ED Disposition   Discharge    Condition   Stable     Date/Time   Tue Jan 2, 2024  4:37 PM    Comment   Abdi Galindo discharge to home/self care.                   Follow-up Information       Follow up With Specialties Details Why Contact Info    Agustin Koo MD Family Medicine   [address] [place] [address] [id number] [phone]              Discharge Medication List as of 1/2/2024  4:38 PM        START taking these medications    Details   Diclofenac Sodium (VOLTAREN) 1 % Apply 4 g topically 4 (four) times a day, Starting Tue 1/2/2024, Normal      methocarbamol (ROBAXIN) 500 mg tablet Take 1 tablet (500 mg total) by mouth 2 (two) times a day, Starting Tue 1/2/2024, Normal           CONTINUE these medications which have NOT CHANGED    Details   METHADONE HCL PO Take 80 mg by mouth in the morning, Historical Med               PDMP Review       None             ED Provider  Attending physically available and evaluated Abdi Galindo. I managed the patient along with the ED Attending.    Electronically Signed by           Angelo Stanton MD  01/02/24 8854       Angelo Stanton MD  01/02/24 1672

## 2024-01-02 NOTE — Clinical Note
Abdi Galindo was seen and treated in our emergency department on 1/2/2024.                Diagnosis:     Abdi  .    He may return on this date: 01/08/2024         If you have any questions or concerns, please don't hesitate to call.      Angelo Stanton MD    ______________________________           _______________          _______________  Hospital Representative                              Date                                Time

## 2024-01-04 NOTE — ED ATTENDING ATTESTATION
1/2/2024  IDevika DO, saw and evaluated the patient. I have discussed the patient with the resident/non-physician practitioner and agree with the resident's/non-physician practitioner's findings, Plan of Care, and MDM as documented in the resident's/non-physician practitioner's note, except where noted. All available labs and Radiology studies were reviewed.  I was present for key portions of any procedure(s) performed by the resident/non-physician practitioner and I was immediately available to provide assistance.       At this point I agree with the current assessment done in the Emergency Department.  I have conducted an independent evaluation of this patient a history and physical is as follows:    History  Patient is a 43 y.o. year old male who presents for evaluation with left leg pain. Patient reports pain over the left lateral buttock radiating down his left leg. He states that symptoms began 2 days ago while he was at work. No known injury. He describes the pain as sharp and electric-like in nature. He denies any associated numbness or weakness in his left foot, and denies any swelling in the left leg. He states that there are certain positions which do make the pain feel better.     Current Outpatient Medications   Medication Instructions    Diclofenac Sodium (VOLTAREN) 4 g, Topical, 4 times daily    METHADONE HCL PO 80 mg, Oral, Daily    methocarbamol (ROBAXIN) 500 mg, Oral, 2 times daily     No past medical history on file.  No past surgical history on file.    Objective  Vitals:    01/02/24 1530   BP: (!) 177/103   BP Location: Right arm   Pulse: 81   Resp: 18   Temp: 98.2 °F (36.8 °C)   TempSrc: Oral   SpO2: 99%       General: VSS, NAD, awake, alert.    Head: Normocephalic, atraumatic.  Eyes: PERRL, EOM-I.   ENT: Atraumatic external nose and ears.     Neck: Symmetric, supple, trachea midline.  CV: RRR. +S1/S2.    Lungs: Respirations unlabored, no tachypnea.    Abd: soft, NT/ND. No guarding. No  peritoneal signs.   MSK: Extremities without tenderness or gross deformity. No lower extremity edema. Tenderness over the left lateral buttock with reproduction of symptoms with palpation.   Skin: Dry, intact. No lesions.  Neuro: AAOx3, GCS 15, CN II-XII grossly intact. Motor function and sensation intact in bilateral lower extremities.    Psychiatric/Behavioral: Appropriate mood and affect. Behavior normal.    Results Reviewed       None          No orders to display     Medications - No data to display       MDM  43 year old male presents with 2 days of left buttock pain with radiation down his left leg. On exam, patient with tenderness over the left lateral buttock with reproduction of his symptoms on palpation. Intact motor and sensory function in the left lower extremity. Symptoms most consistent with acute sciatic nerve pain, likely from piriformis muscle spasm or impingement. Patient given prescriptions for voltaren gel and robaxin, as well as a referral for PT. Patient discharged in stable condition with symptomatic care instructions and strict ED return precautions.      ED Course         Critical Care Time  Procedures

## 2024-07-20 ENCOUNTER — APPOINTMENT (EMERGENCY)
Dept: RADIOLOGY | Facility: HOSPITAL | Age: 44
End: 2024-07-20
Payer: COMMERCIAL

## 2024-07-20 ENCOUNTER — HOSPITAL ENCOUNTER (EMERGENCY)
Facility: HOSPITAL | Age: 44
Discharge: HOME | End: 2024-07-20
Attending: EMERGENCY MEDICINE
Payer: COMMERCIAL

## 2024-07-20 VITALS
SYSTOLIC BLOOD PRESSURE: 195 MMHG | OXYGEN SATURATION: 98 % | TEMPERATURE: 97.9 F | BODY MASS INDEX: 25.91 KG/M2 | HEIGHT: 70 IN | RESPIRATION RATE: 18 BRPM | DIASTOLIC BLOOD PRESSURE: 85 MMHG | WEIGHT: 181 LBS | HEART RATE: 53 BPM

## 2024-07-20 DIAGNOSIS — R07.9 CHEST PAIN: Primary | ICD-10-CM

## 2024-07-20 LAB
ANION GAP SERPL CALCULATED.3IONS-SCNC: 9 MMOL/L (ref 4–13)
BASOPHILS # BLD AUTO: 0.05 THOUSANDS/ÂΜL (ref 0–0.1)
BASOPHILS NFR BLD AUTO: 0 % (ref 0–1)
BUN SERPL-MCNC: 9 MG/DL (ref 5–25)
CALCIUM SERPL-MCNC: 9.1 MG/DL (ref 8.4–10.2)
CARDIAC TROPONIN I PNL SERPL HS: 3 NG/L
CHLORIDE SERPL-SCNC: 100 MMOL/L (ref 96–108)
CO2 SERPL-SCNC: 26 MMOL/L (ref 21–32)
CREAT SERPL-MCNC: 0.67 MG/DL (ref 0.6–1.3)
EOSINOPHIL # BLD AUTO: 0.06 THOUSAND/ÂΜL (ref 0–0.61)
EOSINOPHIL NFR BLD AUTO: 1 % (ref 0–6)
ERYTHROCYTE [DISTWIDTH] IN BLOOD BY AUTOMATED COUNT: 12.6 % (ref 11.6–15.1)
GFR SERPL CREATININE-BSD FRML MDRD: 116 ML/MIN/1.73SQ M
GLUCOSE SERPL-MCNC: 126 MG/DL (ref 65–140)
HCT VFR BLD AUTO: 38.1 % (ref 36.5–49.3)
HGB BLD-MCNC: 12.9 G/DL (ref 12–17)
IMM GRANULOCYTES # BLD AUTO: 0.05 THOUSAND/UL (ref 0–0.2)
IMM GRANULOCYTES NFR BLD AUTO: 0 % (ref 0–2)
LYMPHOCYTES # BLD AUTO: 1.57 THOUSANDS/ÂΜL (ref 0.6–4.47)
LYMPHOCYTES NFR BLD AUTO: 13 % (ref 14–44)
MCH RBC QN AUTO: 30.4 PG (ref 26.8–34.3)
MCHC RBC AUTO-ENTMCNC: 33.9 G/DL (ref 31.4–37.4)
MCV RBC AUTO: 90 FL (ref 82–98)
MONOCYTES # BLD AUTO: 0.74 THOUSAND/ÂΜL (ref 0.17–1.22)
MONOCYTES NFR BLD AUTO: 6 % (ref 4–12)
NEUTROPHILS # BLD AUTO: 9.49 THOUSANDS/ÂΜL (ref 1.85–7.62)
NEUTS SEG NFR BLD AUTO: 80 % (ref 43–75)
NRBC BLD AUTO-RTO: 0 /100 WBCS
PLATELET # BLD AUTO: 274 THOUSANDS/UL (ref 149–390)
PMV BLD AUTO: 9.3 FL (ref 8.9–12.7)
POTASSIUM SERPL-SCNC: 3.5 MMOL/L (ref 3.5–5.3)
RBC # BLD AUTO: 4.25 MILLION/UL (ref 3.88–5.62)
SODIUM SERPL-SCNC: 135 MMOL/L (ref 135–147)
WBC # BLD AUTO: 11.96 THOUSAND/UL (ref 4.31–10.16)

## 2024-07-20 PROCEDURE — 99285 EMERGENCY DEPT VISIT HI MDM: CPT | Performed by: EMERGENCY MEDICINE

## 2024-07-20 PROCEDURE — 36415 COLL VENOUS BLD VENIPUNCTURE: CPT | Performed by: EMERGENCY MEDICINE

## 2024-07-20 PROCEDURE — 85025 COMPLETE CBC W/AUTO DIFF WBC: CPT | Performed by: EMERGENCY MEDICINE

## 2024-07-20 PROCEDURE — 93005 ELECTROCARDIOGRAM TRACING: CPT

## 2024-07-20 PROCEDURE — 99285 EMERGENCY DEPT VISIT HI MDM: CPT

## 2024-07-20 PROCEDURE — 80048 BASIC METABOLIC PNL TOTAL CA: CPT | Performed by: EMERGENCY MEDICINE

## 2024-07-20 PROCEDURE — 84484 ASSAY OF TROPONIN QUANT: CPT | Performed by: EMERGENCY MEDICINE

## 2024-07-20 PROCEDURE — 71045 X-RAY EXAM CHEST 1 VIEW: CPT

## 2024-07-20 RX ORDER — METOPROLOL SUCCINATE 25 MG/1
25 TABLET, EXTENDED RELEASE ORAL DAILY
COMMUNITY
Start: 2024-07-18 | End: 2024-07-23

## 2024-07-20 NOTE — ED PROVIDER NOTES
History  Chief Complaint   Patient presents with    Chest Pain     Patient reports intermittent chest discomfort starting Tuesday - seen at PCP and sent for bloodwork showing mild dehydration.        History provided by:  Patient   used: No    Chest Pain  Associated symptoms: no abdominal pain, no cough, no diaphoresis, no dizziness, no fever, no headache, no nausea, no palpitations, no shortness of breath, not vomiting and no weakness      43-year-old male presenting to the ER with chest pain has been constant since Monday, associate with clammy hands.  No shortness of breath.  No nausea vomiting.  No lightness or dizziness.  No history of the same.  Pain is discomfort on the left side of the chest.  No radiation.  No back neck or jaw pain.  No arm pain.  No weakness.  No history of cocaine use.  No history of cardiac disease.  Has had elevated blood pressures recently.  No family of early MI.  No history of blood clots.      Prior to Admission Medications   Prescriptions Last Dose Informant Patient Reported? Taking?   Diclofenac Sodium (VOLTAREN) 1 % More than a month  No No   Sig: Apply 4 g topically 4 (four) times a day   METHADONE HCL PO 7/20/2024  Yes Yes   Sig: Take 80 mg by mouth in the morning   methocarbamol (ROBAXIN) 500 mg tablet More than a month  No No   Sig: Take 1 tablet (500 mg total) by mouth 2 (two) times a day   metoprolol succinate (TOPROL-XL) 25 mg 24 hr tablet 7/20/2024  Yes Yes   Sig: Take 25 mg by mouth daily      Facility-Administered Medications: None       History reviewed. No pertinent past medical history.    History reviewed. No pertinent surgical history.    History reviewed. No pertinent family history.  I have reviewed and agree with the history as documented.    E-Cigarette/Vaping    E-Cigarette Use Current Every Day User      E-Cigarette/Vaping Substances    Nicotine Yes      Social History     Tobacco Use    Smoking status: Every Day     Current packs/day:  1.50     Types: Cigarettes    Smokeless tobacco: Never   Vaping Use    Vaping status: Every Day    Substances: Nicotine   Substance Use Topics    Drug use: Not Currently     Comment: [place] with dialazine       Review of Systems   Constitutional:  Negative for chills, diaphoresis and fever.   HENT:  Negative for congestion and sore throat.    Respiratory:  Negative for cough, shortness of breath, wheezing and stridor.    Cardiovascular:  Positive for chest pain. Negative for palpitations and leg swelling.   Gastrointestinal:  Negative for abdominal pain, blood in stool, diarrhea, nausea and vomiting.   Genitourinary:  Negative for dysuria, frequency and urgency.   Musculoskeletal:  Negative for neck stiffness.   Skin:  Negative for pallor and rash.   Neurological:  Negative for dizziness, syncope, weakness, light-headedness and headaches.   All other systems reviewed and are negative.      Physical Exam  Physical Exam  Vitals reviewed.   Constitutional:       Appearance: He is well-developed.   HENT:      Head: Normocephalic and atraumatic.   Eyes:      Pupils: Pupils are equal, round, and reactive to light.   Cardiovascular:      Rate and Rhythm: Normal rate and regular rhythm.      Heart sounds: Normal heart sounds.   Pulmonary:      Effort: Pulmonary effort is normal. No respiratory distress.      Breath sounds: Normal breath sounds.   Abdominal:      General: Bowel sounds are normal.      Palpations: Abdomen is soft.      Tenderness: There is no abdominal tenderness.   Musculoskeletal:         General: Normal range of motion.      Cervical back: Neck supple.      Right lower leg: No tenderness. No edema.      Left lower leg: No tenderness. No edema.   Skin:     General: Skin is warm and dry.      Capillary Refill: Capillary refill takes less than 2 seconds.   Neurological:      General: No focal deficit present.      Mental Status: He is alert and oriented to person, place, and time.         Vital Signs  ED  Triage Vitals [07/20/24 1550]   Temperature Pulse Respirations Blood Pressure SpO2   97.9 °F (36.6 °C) 72 18 122/72 99 %      Temp Source Heart Rate Source Patient Position - Orthostatic VS BP Location FiO2 (%)   Oral Monitor Sitting Right arm --      Pain Score       4           Vitals:    07/20/24 1550 07/20/24 1600   BP: 122/72 (!) 195/85   Pulse: 72 (!) 53   Patient Position - Orthostatic VS: Sitting Sitting         Visual Acuity      ED Medications  Medications - No data to display    Diagnostic Studies  Results Reviewed       Procedure Component Value Units Date/Time    HS Troponin 0hr (reflex protocol) [[id number]]  (Normal) Collected: 07/20/24 1613    Lab Status: Final result Specimen: Blood from Arm, Right Updated: 07/20/24 1644     hs TnI 0hr 3 ng/L     Basic metabolic panel [[id number]] Collected: 07/20/24 1613    Lab Status: Final result Specimen: Blood from Arm, Right Updated: 07/20/24 1636     Sodium 135 mmol/L      Potassium 3.5 mmol/L      Chloride 100 mmol/L      CO2 26 mmol/L      ANION GAP 9 mmol/L      BUN 9 mg/dL      Creatinine 0.67 mg/dL      Glucose 126 mg/dL      Calcium 9.1 mg/dL      eGFR 116 ml/min/1.73sq m     Narrative:      National Kidney Disease Foundation guidelines for Chronic Kidney Disease (CKD):     Stage 1 with normal or high GFR (GFR > 90 mL/min/1.73 square meters)    Stage 2 Mild CKD (GFR = 60-89 mL/min/1.73 square meters)    Stage 3A Moderate CKD (GFR = 45-59 mL/min/1.73 square meters)    Stage 3B Moderate CKD (GFR = 30-44 mL/min/1.73 square meters)    Stage 4 Severe CKD (GFR = 15-29 mL/min/1.73 square meters)    Stage 5 End Stage CKD (GFR <15 mL/min/1.73 square meters)  Note: GFR calculation is accurate only with a steady state creatinine    CBC and differential [[id number]]  (Abnormal) Collected: 07/20/24 1613    Lab Status: Final result Specimen: Blood from Arm, Right Updated: 07/20/24 1621     WBC 11.96 Thousand/uL      RBC 4.25 Million/uL      Hemoglobin 12.9 g/dL       Hematocrit 38.1 %      MCV 90 fL      MCH 30.4 pg      MCHC 33.9 g/dL      RDW 12.6 %      MPV 9.3 fL      Platelets 274 Thousands/uL      nRBC 0 /100 WBCs      Segmented % 80 %      Immature Grans % 0 %      Lymphocytes % 13 %      Monocytes % 6 %      Eosinophils Relative 1 %      Basophils Relative 0 %      Absolute Neutrophils 9.49 Thousands/µL      Absolute Immature Grans 0.05 Thousand/uL      Absolute Lymphocytes 1.57 Thousands/µL      Absolute Monocytes 0.74 Thousand/µL      Eosinophils Absolute 0.06 Thousand/µL      Basophils Absolute 0.05 Thousands/µL                    XR chest 1 view portable   ED Interpretation by Belia Estrella MD (07/20 1622)   No acute findings                 Procedures  Procedures         ED Course  ED Course as of 07/20/24 2244   Sat Jul 20, 2024   1648 ECG shows rate 57, sinus, normal axis, normal QRS, nonspecific ST segments, no significant ST elevations depressions, no significant T wave changes, no significant changes from previous EKG, independently interpret by me               HEART Risk Score      Flowsheet Row Most Recent Value   Heart Score Risk Calculator    History 0 Filed at: 07/20/2024 1704   ECG 1 Filed at: 07/20/2024 1704   Age 0 Filed at: 07/20/2024 1704   Risk Factors 1 Filed at: 07/20/2024 1704   Troponin 0 Filed at: 07/20/2024 1704   HEART Score 2 Filed at: 07/20/2024 1704                  PERC Rule for PE      Flowsheet Row Most Recent Value   PERC Rule for PE    Age >=50 0 Filed at: 07/20/2024 1705   HR >=100 0 Filed at: 07/20/2024 1705   O2 Sat on room air < 95% 0 Filed at: 07/20/2024 1705   History of PE or DVT 0 Filed at: 07/20/2024 1705   Recent trauma or surgery 0 Filed at: 07/20/2024 1705   Hemoptysis 0 Filed at: 07/20/2024 1705   Exogenous estrogen 0 Filed at: 07/20/2024 1705   Unilateral leg swelling 0 Filed at: 07/20/2024 1705   PERC Rule for PE Results 0 Filed at: 07/20/2024 1705                SBIRT 22yo+      Flowsheet Row Most Recent Value    Initial Alcohol Screen: US AUDIT-C     1. How often do you have a drink containing alcohol? 0 Filed at: 07/20/2024 1552   2. How many drinks containing alcohol do you have on a typical day you are drinking?  0 Filed at: 07/20/2024 1552   3a. Male UNDER 65: How often do you have five or more drinks on one occasion? 0 Filed at: 07/20/2024 1552   3b. FEMALE Any Age, or MALE 65+: How often do you have 4 or more drinks on one occassion? 0 Filed at: 07/20/2024 1552   Audit-C Score 0 Filed at: 07/20/2024 1552   SURI: How many times in the past year have you...    Used an illegal drug or used a prescription medication for non-medical reasons? Never Filed at: 07/20/2024 1552            Wells' Criteria for PE      Flowsheet Row Most Recent Value   Wells' Criteria for PE    Clinical signs and symptoms of DVT 0 Filed at: 07/20/2024 1705   PE is primary diagnosis or equally likely 0 Filed at: 07/20/2024 1705   HR >100 0 Filed at: 07/20/2024 1705   Immobilization at least 3 days or Surgery in the previous 4 weeks 0 Filed at: 07/20/2024 1705   Previous, objectively diagnosed PE or DVT 0 Filed at: 07/20/2024 1705   Hemoptysis 0 Filed at: 07/20/2024 1705   Malignancy with treatment within 6 months or palliative 0 Filed at: 07/20/2024 1705   Wells' Criteria Total 0 Filed at: 07/20/2024 1705                  Medical Decision Making  Pt with chest pain, will do EKG to evaluate for ischemia, troponin to evaluate for cardiac injury, check CBC to eval for anemia, check CMP to evaluate electrolyte abnormalities, chest x-ray to eval for pneumonia or pneumothorax.    Workup without significant abnormality.  Patient has a family physician that he will follow-up with.  Patient understands and agreeable with plan.        Amount and/or Complexity of Data Reviewed  Labs: ordered.  Radiology: ordered and independent interpretation performed.                 Disposition  Final diagnoses:   Chest pain     Time reflects when diagnosis was  documented in both MDM as applicable and the Disposition within this note       Time User Action Codes Description Comment    7/20/2024  5:05 PM Belia Estrella Add [R07.9] Chest pain           ED Disposition       ED Disposition   Discharge    Condition   Stable    Date/Time   Sat Jul 20, 2024 1705    Comment   Abdi Galindo discharge to home/self care.                   Follow-up Information       Follow up With Specialties Details Why Contact Info Additional Information    Agustin Koo MD Family Medicine Schedule an appointment as soon as possible for a visit   [address] [place] [id number]  [phone]       formerly [facility] Emergency Department Emergency Medicine  As needed, If symptoms worsen [facility] [address]  [phone] formerly [facility] Emergency Department, [facility] [place], [place], [address]            Discharge Medication List as of 7/20/2024  5:05 PM        CONTINUE these medications which have NOT CHANGED    Details   METHADONE HCL PO Take 80 mg by mouth in the morning, Historical Med      metoprolol succinate (TOPROL-XL) 25 mg 24 hr tablet Take 25 mg by mouth daily, Starting Thu 7/18/2024, Until Wed 10/16/2024, Historical Med      Diclofenac Sodium (VOLTAREN) 1 % Apply 4 g topically 4 (four) times a day, Starting Tue 1/2/2024, Normal      methocarbamol (ROBAXIN) 500 mg tablet Take 1 tablet (500 mg total) by mouth 2 (two) times a day, Starting Tue 1/2/2024, Normal             No discharge procedures on file.    PDMP Review       None            ED Provider  Electronically Signed by             Belia Estrella MD  07/20/24 4581

## 2024-07-21 LAB
ATRIAL RATE: 57 BPM
P AXIS: 57 DEGREES
PR INTERVAL: 164 MS
QRS AXIS: 73 DEGREES
QRSD INTERVAL: 98 MS
QT INTERVAL: 434 MS
QTC INTERVAL: 422 MS
T WAVE AXIS: 21 DEGREES
VENTRICULAR RATE: 57 BPM

## 2024-07-21 PROCEDURE — 93010 ELECTROCARDIOGRAM REPORT: CPT | Performed by: INTERNAL MEDICINE

## 2024-07-23 ENCOUNTER — HOSPITAL ENCOUNTER (EMERGENCY)
Facility: HOSPITAL | Age: 44
Discharge: HOME | End: 2024-07-23
Attending: EMERGENCY MEDICINE
Payer: COMMERCIAL

## 2024-07-23 VITALS
DIASTOLIC BLOOD PRESSURE: 83 MMHG | SYSTOLIC BLOOD PRESSURE: 177 MMHG | TEMPERATURE: 98.4 F | OXYGEN SATURATION: 96 % | RESPIRATION RATE: 20 BRPM | HEART RATE: 67 BPM

## 2024-07-23 DIAGNOSIS — I10 HYPERTENSION: Primary | ICD-10-CM

## 2024-07-23 DIAGNOSIS — F41.9 ANXIETY: ICD-10-CM

## 2024-07-23 LAB
2HR DELTA HS TROPONIN: 5 NG/L
CARDIAC TROPONIN I PNL SERPL HS: 12 NG/L
CARDIAC TROPONIN I PNL SERPL HS: 7 NG/L
HOLD SPECIMEN: NORMAL

## 2024-07-23 PROCEDURE — 99284 EMERGENCY DEPT VISIT MOD MDM: CPT

## 2024-07-23 PROCEDURE — 93005 ELECTROCARDIOGRAM TRACING: CPT

## 2024-07-23 PROCEDURE — 99285 EMERGENCY DEPT VISIT HI MDM: CPT | Performed by: EMERGENCY MEDICINE

## 2024-07-23 PROCEDURE — 84484 ASSAY OF TROPONIN QUANT: CPT | Performed by: EMERGENCY MEDICINE

## 2024-07-23 PROCEDURE — 36415 COLL VENOUS BLD VENIPUNCTURE: CPT

## 2024-07-23 RX ORDER — HYDROXYZINE HYDROCHLORIDE 25 MG/1
25 TABLET, FILM COATED ORAL ONCE
Status: COMPLETED | OUTPATIENT
Start: 2024-07-23 | End: 2024-07-23

## 2024-07-23 RX ORDER — AMLODIPINE BESYLATE 10 MG/1
10 TABLET ORAL DAILY
Qty: 30 TABLET | Refills: 0 | Status: SHIPPED | OUTPATIENT
Start: 2024-07-23 | End: 2024-08-22

## 2024-07-23 RX ORDER — AMLODIPINE BESYLATE 5 MG/1
10 TABLET ORAL DAILY
Status: DISCONTINUED | OUTPATIENT
Start: 2024-07-23 | End: 2024-07-23 | Stop reason: HOSPADM

## 2024-07-23 RX ADMIN — AMLODIPINE BESYLATE 10 MG: 5 TABLET ORAL at 19:00

## 2024-07-23 RX ADMIN — HYDROXYZINE HYDROCHLORIDE 25 MG: 25 TABLET ORAL at 19:00

## 2024-07-24 LAB
ATRIAL RATE: 64 BPM
HOLD SPECIMEN: NORMAL
P AXIS: 71 DEGREES
PR INTERVAL: 158 MS
QRS AXIS: 76 DEGREES
QRSD INTERVAL: 90 MS
QT INTERVAL: 406 MS
QTC INTERVAL: 418 MS
T WAVE AXIS: 42 DEGREES
VENTRICULAR RATE: 64 BPM

## 2024-07-24 PROCEDURE — 93010 ELECTROCARDIOGRAM REPORT: CPT | Performed by: INTERNAL MEDICINE

## 2024-07-25 NOTE — ED PROVIDER NOTES
"History  Chief Complaint   Patient presents with    Anxiety     Pt states he gets a warm sensation in R chest and this leads him to have panic attacks. Pt states he did have blood work this morning and gets checked frequently for this, but everything is fine.      43-year-old male comes in for evaluation of anxiety.  Patient states that for several weeks now he gets these episodes where he gets \"warm sensation in his right chest\" this makes him anxious about what is causing this and this leads to full-blown panic attacks.  Patient has been seen by his PCP and was told it was because high blood pressure and he recently started metoprolol.  Patient states he does not like the metoprolol as it often upsets his stomach.  Patient states he did get checked for blood work this morning that everything was fine but he is still having the sensation in his chest.  He also feels very anxious.  Denies any injury no fever or chills no shortness of breath no nausea or vomiting.  Patient is a daily smoker.      History provided by:  Patient   used: No    Anxiety  Presenting symptoms comment:  Anxiety  Patient accompanied by:  Family member  Degree of incapacity (severity):  Moderate  Onset quality:  Gradual  Duration:  2 weeks  Timing:  Intermittent  Progression:  Waxing and waning  Chronicity:  New  Context: recent medication change    Compliance with total regimen: On blood pressure medication but untreated for anxiety.  Ineffective treatments:  None tried  Associated symptoms: anxiety and chest pain    Associated symptoms: no abdominal pain    Risk factors: no recent psychiatric admission        Prior to Admission Medications   Prescriptions Last Dose Informant Patient Reported? Taking?   Diclofenac Sodium (VOLTAREN) 1 %   No No   Sig: Apply 4 g topically 4 (four) times a day   METHADONE HCL PO   Yes No   Sig: Take 80 mg by mouth in the morning   methocarbamol (ROBAXIN) 500 mg tablet   No No   Sig: Take 1 " tablet (500 mg total) by mouth 2 (two) times a day   metoprolol succinate (TOPROL-XL) 25 mg 24 hr tablet   Yes No   Sig: Take 25 mg by mouth daily      Facility-Administered Medications: None       History reviewed. No pertinent past medical history.    History reviewed. No pertinent surgical history.    History reviewed. No pertinent family history.  I have reviewed and agree with the history as documented.    E-Cigarette/Vaping    E-Cigarette Use Current Every Day User      E-Cigarette/Vaping Substances    Nicotine Yes      Social History     Tobacco Use    Smoking status: Every Day     Current packs/day: 1.50     Types: Cigarettes    Smokeless tobacco: Never   Vaping Use    Vaping status: Every Day    Substances: Nicotine   Substance Use Topics    Drug use: Not Currently     Comment: [place] with dialazine       Review of Systems   Constitutional:  Negative for chills and fever.   HENT:  Negative for ear pain and sore throat.    Eyes:  Negative for pain and visual disturbance.   Respiratory:  Negative for cough and shortness of breath.    Cardiovascular:  Positive for chest pain. Negative for palpitations.   Gastrointestinal:  Negative for abdominal pain and vomiting.   Genitourinary:  Negative for dysuria and hematuria.   Musculoskeletal:  Negative for arthralgias and back pain.   Skin:  Negative for color change and rash.   Neurological:  Negative for seizures and syncope.   Psychiatric/Behavioral:  The patient is nervous/anxious.    All other systems reviewed and are negative.      Physical Exam  Physical Exam  Vitals and nursing note reviewed.   Constitutional:       General: He is not in acute distress.     Appearance: He is well-developed.   HENT:      Head: Normocephalic and atraumatic.   Eyes:      Conjunctiva/sclera: Conjunctivae normal.   Cardiovascular:      Rate and Rhythm: Normal rate and regular rhythm.      Heart sounds: No murmur heard.  Pulmonary:      Effort: Pulmonary effort is normal. No  respiratory distress.      Breath sounds: Normal breath sounds.   Abdominal:      Palpations: Abdomen is soft.      Tenderness: There is no abdominal tenderness.   Musculoskeletal:         General: No swelling.      Cervical back: Neck supple.   Skin:     General: Skin is warm and dry.      Capillary Refill: Capillary refill takes less than 2 seconds.   Neurological:      Mental Status: He is alert.   Psychiatric:         Mood and Affect: Mood normal.         Vital Signs  ED Triage Vitals [07/23/24 1639]   Temperature Pulse Respirations Blood Pressure SpO2   98.4 °F (36.9 °C) 87 18 (!) 189/109 99 %      Temp Source Heart Rate Source Patient Position - Orthostatic VS BP Location FiO2 (%)   Oral Monitor Sitting Right arm --      Pain Score       --           Vitals:    07/23/24 1639 07/23/24 1833 07/23/24 1900   BP: (!) 189/109 (!) 194/80 (!) 177/83   Pulse: 87 67    Patient Position - Orthostatic VS: Sitting           Visual Acuity      ED Medications  Medications   hydrOXYzine HCL (ATARAX) tablet 25 mg (25 mg Oral Given 7/23/24 1900)       Diagnostic Studies  Results Reviewed       Procedure Component Value Units Date/Time    [place] draw [[id number]] Collected: 07/23/24 1650    Lab Status: Final result Specimen: Blood from Arm, Right Updated: 07/24/24 1410    Narrative:      The following orders were created for panel order [place] draw.  Procedure                               Abnormality         Status                     ---------                               -----------         ------                     Light Blue Top on hold[[id number]]                           Final result               Gold top on hold[[id number]]                                 Final result               Green / Yellow tube on hold[[id number]]                      Final result               Green / Black tube on hold[[id number]]                       Final result               Lavender Top 3 ml on hold[[id number]]                        Final  result               Lavender Top 7ml on hold[[id number]]                         Final result                 Please view results for these tests on the individual orders.    HS Troponin I 2hr [[id number]]  (Normal) Collected: 07/23/24 1940    Lab Status: Final result Specimen: Blood from Arm, Right Updated: 07/23/24 2011     hs TnI 2hr 12 ng/L      Delta 2hr hsTnI 5 ng/L     HS Troponin 0hr (reflex protocol) [[id number]]  (Normal) Collected: 07/23/24 1650    Lab Status: Final result Specimen: Blood from Arm, Right Updated: 07/23/24 1858     hs TnI 0hr 7 ng/L                    No orders to display              Procedures  ECG 12 Lead Documentation Only    Date/Time: 7/23/2024 4:44 PM    Performed by: Little Stallings DO  Authorized by: Little Stallings DO    Patient location:  ED  Rate:     ECG rate:  64  Rhythm:     Rhythm: sinus rhythm    Ectopy:     Ectopy: none    QRS:     QRS axis:  Normal           ED Course  ED Course as of 07/25/24 1840 Tue Jul 23, 2024 2017 Patient states he did not like taking the metoprolol because he felt like it gave him an upset stomach.  We tried amlodipine here in hopes that patient would be able to better tolerated.                                               Medical Decision Making  Differential diagnosis includes but is not limited to hypertensive crisis, ACS, arrhythmia, anemia, electrolyte abnormality, musculoskeletal pain,    Problems Addressed:  Anxiety: acute illness or injury  Hypertension: chronic illness or injury with exacerbation, progression, or side effects of treatment    Amount and/or Complexity of Data Reviewed  Labs: ordered. Decision-making details documented in ED Course.  ECG/medicine tests: ordered and independent interpretation performed. Decision-making details documented in ED Course.    Risk  OTC drugs.  Prescription drug management.  Risk Details: Patient may take Tylenol over-the-counter's for pain.  Discussed with patient results and switching  around his blood pressure medications will follow-up with PCP                 Disposition  Final diagnoses:   Hypertension   Anxiety     Time reflects when diagnosis was documented in both MDM as applicable and the Disposition within this note       Time User Action Codes Description Comment    7/23/2024  8:15 PM Little Stallings Add [I10] Hypertension     7/23/2024  8:15 PM Little Stallings Add [F41.9] Anxiety           ED Disposition       ED Disposition   Discharge    Condition   Stable    Date/Time   Tue Jul 23, 2024  8:15 PM    Comment   Abdi Galindo discharge to home/self care.                   Follow-up Information       Follow up With Specialties Details Why Contact Info    Agustin Koo MD Family Medicine Schedule an appointment as soon as possible for a visit   [address] [place] [address] [id number] [phone]              Discharge Medication List as of 7/23/2024  8:17 PM        START taking these medications    Details   amLODIPine (NORVASC) 10 mg tablet Take 1 tablet (10 mg total) by mouth daily, Starting Tue 7/23/2024, Until Thu 8/22/2024, Normal           CONTINUE these medications which have NOT CHANGED    Details   Diclofenac Sodium (VOLTAREN) 1 % Apply 4 g topically 4 (four) times a day, Starting Tue 1/2/2024, Normal      METHADONE HCL PO Take 80 mg by mouth in the morning, Historical Med      methocarbamol (ROBAXIN) 500 mg tablet Take 1 tablet (500 mg total) by mouth 2 (two) times a day, Starting Tue 1/2/2024, Normal           STOP taking these medications       metoprolol succinate (TOPROL-XL) 25 mg 24 hr tablet Comments:   Reason for Stopping:               No discharge procedures on file.    PDMP Review       None            ED Provider  Electronically Signed by             Little Stallings DO  07/25/24 0936

## 2024-07-28 ENCOUNTER — HOSPITAL ENCOUNTER (EMERGENCY)
Facility: HOSPITAL | Age: 44
Discharge: HOME | End: 2024-07-28
Attending: EMERGENCY MEDICINE
Payer: COMMERCIAL

## 2024-07-28 VITALS
TEMPERATURE: 97.8 F | DIASTOLIC BLOOD PRESSURE: 63 MMHG | OXYGEN SATURATION: 98 % | SYSTOLIC BLOOD PRESSURE: 136 MMHG | HEART RATE: 75 BPM | RESPIRATION RATE: 18 BRPM

## 2024-07-28 DIAGNOSIS — R00.1 SINUS BRADYCARDIA: Primary | ICD-10-CM

## 2024-07-28 DIAGNOSIS — F41.0 PANIC ATTACK: ICD-10-CM

## 2024-07-28 LAB — CARDIAC TROPONIN I PNL SERPL HS: 3 NG/L

## 2024-07-28 PROCEDURE — 99283 EMERGENCY DEPT VISIT LOW MDM: CPT

## 2024-07-28 PROCEDURE — 84484 ASSAY OF TROPONIN QUANT: CPT

## 2024-07-28 PROCEDURE — 99284 EMERGENCY DEPT VISIT MOD MDM: CPT | Performed by: EMERGENCY MEDICINE

## 2024-07-28 PROCEDURE — 93005 ELECTROCARDIOGRAM TRACING: CPT

## 2024-07-28 PROCEDURE — 36415 COLL VENOUS BLD VENIPUNCTURE: CPT

## 2024-07-28 NOTE — ED PROVIDER NOTES
"History  Chief Complaint   Patient presents with    Anxiety     States for the past month has been having feelings of anxiety. Reports cannot shut his mind off and it feels like a snow ball effect. Was started on BP meds, which was working pt states, but today had another \"episode\".     Pt is a 43 y.o. male with a PMH as described below who presents to the ED on July 29, 2024 with atypical chest pain.  Patient states that for the past month he has been having increasing levels of stress and feelings of anxiety.  Patient states that he has began to notice today that he had left substernal chest pressure, without radiation.  Denies any associated shortness of breath, aggravation of pain with movement, exertion.  Symptoms started this afternoon, and have resolved since.  Patient denies any other associated symptoms including fever, chills, abdominal pain, nausea, diaphoresis, vomiting, cough, shortness of breath, dyspnea.  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  Otherwise no complaints at this time.          Prior to Admission Medications   Prescriptions Last Dose Informant Patient Reported? Taking?   Diclofenac Sodium (VOLTAREN) 1 % Not Taking  No No   Sig: Apply 4 g topically 4 (four) times a day   Patient not taking: Reported on 7/28/2024   METHADONE HCL PO 7/28/2024  Yes Yes   Sig: Take 80 mg by mouth in the morning   amLODIPine (NORVASC) 10 mg tablet 7/28/2024  No Yes   Sig: Take 1 tablet (10 mg total) by mouth daily   methocarbamol (ROBAXIN) 500 mg tablet Not Taking  No No   Sig: Take 1 tablet (500 mg total) by mouth 2 (two) times a day   Patient not taking: Reported on 7/28/2024      Facility-Administered Medications: None       History reviewed. No pertinent past medical history.    History reviewed. No pertinent surgical history.    History reviewed. No pertinent family history.  I have reviewed and agree with " the history as documented.    E-Cigarette/Vaping    E-Cigarette Use Current Every Day User      E-Cigarette/Vaping Substances    Nicotine Yes      Social History     Tobacco Use    Smoking status: Every Day     Current packs/day: 1.50     Types: Cigarettes    Smokeless tobacco: Never   Vaping Use    Vaping status: Every Day    Substances: Nicotine   Substance Use Topics    Drug use: Not Currently     Comment: stanton with dialazine        Review of Systems   Constitutional:  Negative for chills and fever.   HENT:  Negative for ear pain and sore throat.    Eyes:  Negative for pain and visual disturbance.   Respiratory:  Negative for cough and shortness of breath.    Cardiovascular:  Positive for chest pain and palpitations.   Gastrointestinal:  Negative for abdominal pain and vomiting.   Genitourinary:  Negative for dysuria and hematuria.   Musculoskeletal:  Negative for arthralgias and back pain.   Skin:  Negative for color change and rash.   Neurological:  Negative for seizures and syncope.   All other systems reviewed and are negative.      Physical Exam  ED Triage Vitals [07/28/24 1933]   Temperature Pulse Respirations Blood Pressure SpO2   97.8 °F (36.6 °C) 75 18 136/63 98 %      Temp Source Heart Rate Source Patient Position - Orthostatic VS BP Location FiO2 (%)   Oral Monitor Lying Right arm --      Pain Score       --             Orthostatic Vital Signs  Vitals:    07/28/24 1933   BP: 136/63   Pulse: 75   Patient Position - Orthostatic VS: Lying       Physical Exam  Vitals and nursing note reviewed.   Constitutional:       General: He is not in acute distress.     Appearance: He is well-developed.   HENT:      Head: Normocephalic and atraumatic.   Eyes:      Conjunctiva/sclera: Conjunctivae normal.   Cardiovascular:      Rate and Rhythm: Normal rate and regular rhythm.      Heart sounds: No murmur heard.  Pulmonary:      Effort: Pulmonary effort is normal. No respiratory distress.      Breath sounds:  Normal breath sounds.   Abdominal:      Palpations: Abdomen is soft.      Tenderness: There is no abdominal tenderness.   Musculoskeletal:         General: No swelling.      Cervical back: Neck supple.   Skin:     General: Skin is warm and dry.      Capillary Refill: Capillary refill takes less than 2 seconds.   Neurological:      Mental Status: He is alert.   Psychiatric:         Mood and Affect: Mood normal.         ED Medications  Medications - No data to display    Diagnostic Studies  Results Reviewed       Procedure Component Value Units Date/Time    HS Troponin 0hr (reflex protocol) [[id number]]  (Normal) Collected: 07/28/24 2051    Lab Status: Final result Specimen: Blood from Arm, Right Updated: 07/28/24 2121     hs TnI 0hr 3 ng/L                    No orders to display         Procedures  Procedures      ED Course             HEART Risk Score      Flowsheet Row Most Recent Value   Heart Score Risk Calculator    History 0 Filed at: 08/10/2024 1748   ECG 0 Filed at: 08/10/2024 1748   Age 0 Filed at: 08/10/2024 1748   Risk Factors 1 Filed at: 08/10/2024 1748   Troponin 0 Filed at: 08/10/2024 1748   HEART Score 1 Filed at: 08/10/2024 1748                                  Medical Decision Making  Exam without evidence of volume overload so doubt heart failure. EKG without signs of active ischemia. Given the timing of pain to ER presentation, single troponin 3 so doubt NSTEMI. Presentation not consistent with acute PE (PERC negative), pneumothorax (not visualized on chest xr), thoracic aortic dissection, pericarditis, tamponade, pneumonia (no infectious symptoms), myocarditis (no recent illness, neg trop). HEART score: 1. Believe that current symptoms are related to added stress, anxiety, and panic related to personal life. Discharge patient home with PMD follow up.    Amount and/or Complexity of Data Reviewed  Labs: ordered.          Disposition  Final diagnoses:   Sinus bradycardia   Panic attack     Time  reflects when diagnosis was documented in both MDM as applicable and the Disposition within this note       Time User Action Codes Description Comment    7/28/2024 10:12 PM Madhav Hensley [R00.1] Sinus bradycardia     7/28/2024 10:12 PM Madhav Hensley [F41.0] Panic attack           ED Disposition       ED Disposition   Discharge    Condition   Stable    Date/Time   Sun Jul 28, 2024 2212    Comment   Abdi Galindo discharge to home/self care.                   Follow-up Information       Follow up With Specialties Details Why Contact Info    Agustin Koo MD Family Medicine Call  to discuss this visit and schedule close follow-up [address] [place] [phone]  [phone]              Discharge Medication List as of 7/28/2024 10:26 PM        CONTINUE these medications which have NOT CHANGED    Details   amLODIPine (NORVASC) 10 mg tablet Take 1 tablet (10 mg total) by mouth daily, Starting Tue 7/23/2024, Until Thu 8/22/2024, Normal      METHADONE HCL PO Take 80 mg by mouth in the morning, Historical Med      Diclofenac Sodium (VOLTAREN) 1 % Apply 4 g topically 4 (four) times a day, Starting Tue 1/2/2024, Normal      methocarbamol (ROBAXIN) 500 mg tablet Take 1 tablet (500 mg total) by mouth 2 (two) times a day, Starting Tue 1/2/2024, Normal           No discharge procedures on file.    PDMP Review       None             ED Provider  Attending physically available and evaluated Abdi Galindo. I managed the patient along with the ED Attending.    Electronically Signed by           Madhav Hensley MD  07/30/24 0012       Madhav Hensley MD  08/10/24 5988

## 2024-07-31 LAB
ATRIAL RATE: 46 BPM
P AXIS: 52 DEGREES
PR INTERVAL: 158 MS
QRS AXIS: 68 DEGREES
QRSD INTERVAL: 88 MS
QT INTERVAL: 506 MS
QTC INTERVAL: 442 MS
T WAVE AXIS: 54 DEGREES
VENTRICULAR RATE: 46 BPM

## 2024-07-31 PROCEDURE — 93010 ELECTROCARDIOGRAM REPORT: CPT | Performed by: INTERNAL MEDICINE

## 2024-08-03 NOTE — ED ATTENDING ATTESTATION
7/28/2024  I, Rashmi Vitale MD, saw and evaluated the patient. I have discussed the patient with the resident/non-physician practitioner and agree with the resident's/non-physician practitioner's findings, Plan of Care, and MDM as documented in the resident's/non-physician practitioner's note, except where noted. All available labs and Radiology studies were reviewed.  I was present for key portions of any procedure(s) performed by the resident/non-physician practitioner and I was immediately available to provide assistance.       At this point I agree with the current assessment done in the Emergency Department.  I have conducted an independent evaluation of this patient a history and physical is as follows:    Patient is a 43-year-old male who presents to the emergency department for evaluation after experiencing pressure and warmth sensation side of the chest.  This has occurred intermittently over the last year though has been more frequent recently including presently having lasted several hours.  He notes that this is not painful & that palpitations are not specifically rapid, slow or irregular but rather heartbeat is intently noted.  He does not appreciate worsening with exertion.  He was recently switched from metoprolol to Norvasc episodes.  Episodes diminished but then returned.  He has been under increased stress recently.  No known personal or family history of heart disease.  He does have hypertension.  He did have a couple of recent ED visits for similar symptoms.  Study results were unremarkable.    On exam he was well-appearing in no acute distress.  EKG and labs reviewed.  Noted to be bradycardic though not hypotensive.  This may be partly related to opioid (methadone) use.    No evidence of dysrhythmia or ischemia on EKG.  Troponin normal.    Highly doubt ACS.  Strong suspicion that anxiety may be responsible for symptoms.  He denies having noted any GI symptoms.  Will follow-up  with PCP.  ED Course         Critical Care Time  Procedures

## 2025-02-05 ENCOUNTER — TELEPHONE (OUTPATIENT)
Age: 45
End: 2025-02-05

## 2025-02-05 NOTE — TELEPHONE ENCOUNTER
Pt's wife calling to sched apt. Interested in 0400PM w/Dhruv. Needs to check w/work if she can get out early and will give a call back to sched.